# Patient Record
Sex: MALE | Race: WHITE | NOT HISPANIC OR LATINO
[De-identification: names, ages, dates, MRNs, and addresses within clinical notes are randomized per-mention and may not be internally consistent; named-entity substitution may affect disease eponyms.]

---

## 2019-08-22 PROBLEM — Z00.00 ENCOUNTER FOR PREVENTIVE HEALTH EXAMINATION: Status: ACTIVE | Noted: 2019-08-22

## 2019-09-04 ENCOUNTER — NON-APPOINTMENT (OUTPATIENT)
Age: 74
End: 2019-09-04

## 2019-09-04 ENCOUNTER — APPOINTMENT (OUTPATIENT)
Dept: CARDIOLOGY | Facility: CLINIC | Age: 74
End: 2019-09-04
Payer: MEDICARE

## 2019-09-04 VITALS
OXYGEN SATURATION: 95 % | HEIGHT: 72 IN | WEIGHT: 214 LBS | HEART RATE: 92 BPM | BODY MASS INDEX: 28.99 KG/M2 | DIASTOLIC BLOOD PRESSURE: 62 MMHG | SYSTOLIC BLOOD PRESSURE: 114 MMHG

## 2019-09-04 VITALS — DIASTOLIC BLOOD PRESSURE: 68 MMHG | SYSTOLIC BLOOD PRESSURE: 114 MMHG

## 2019-09-04 DIAGNOSIS — M75.50 BURSITIS OF UNSPECIFIED SHOULDER: ICD-10-CM

## 2019-09-04 DIAGNOSIS — Z78.9 OTHER SPECIFIED HEALTH STATUS: ICD-10-CM

## 2019-09-04 DIAGNOSIS — Z82.5 FAMILY HISTORY OF ASTHMA AND OTHER CHRONIC LOWER RESPIRATORY DISEASES: ICD-10-CM

## 2019-09-04 DIAGNOSIS — Z85.46 PERSONAL HISTORY OF MALIGNANT NEOPLASM OF PROSTATE: ICD-10-CM

## 2019-09-04 DIAGNOSIS — F12.90 CANNABIS USE, UNSPECIFIED, UNCOMPLICATED: ICD-10-CM

## 2019-09-04 DIAGNOSIS — Z86.39 PERSONAL HISTORY OF OTHER ENDOCRINE, NUTRITIONAL AND METABOLIC DISEASE: ICD-10-CM

## 2019-09-04 DIAGNOSIS — Z86.79 PERSONAL HISTORY OF OTHER DISEASES OF THE CIRCULATORY SYSTEM: ICD-10-CM

## 2019-09-04 DIAGNOSIS — Z87.39 PERSONAL HISTORY OF OTHER DISEASES OF THE MUSCULOSKELETAL SYSTEM AND CONNECTIVE TISSUE: ICD-10-CM

## 2019-09-04 DIAGNOSIS — Z82.49 FAMILY HISTORY OF ISCHEMIC HEART DISEASE AND OTHER DISEASES OF THE CIRCULATORY SYSTEM: ICD-10-CM

## 2019-09-04 DIAGNOSIS — Z60.2 PROBLEMS RELATED TO LIVING ALONE: ICD-10-CM

## 2019-09-04 PROCEDURE — 99215 OFFICE O/P EST HI 40 MIN: CPT

## 2019-09-04 PROCEDURE — 93000 ELECTROCARDIOGRAM COMPLETE: CPT

## 2019-09-04 RX ORDER — INSULIN DEGLUDEC INJECTION 100 U/ML
100 INJECTION, SOLUTION SUBCUTANEOUS
Qty: 15 | Refills: 0 | Status: DISCONTINUED | COMMUNITY
Start: 2019-03-07

## 2019-09-04 RX ORDER — EMPAGLIFLOZIN 10 MG/1
10 TABLET, FILM COATED ORAL
Qty: 45 | Refills: 0 | Status: DISCONTINUED | COMMUNITY
Start: 2019-03-21

## 2019-09-04 RX ORDER — EMPAGLIFLOZIN 25 MG/1
25 TABLET, FILM COATED ORAL
Qty: 90 | Refills: 0 | Status: DISCONTINUED | COMMUNITY
Start: 2019-05-02

## 2019-09-04 RX ORDER — SITAGLIPTIN AND METFORMIN HYDROCHLORIDE 50; 1000 MG/1; MG/1
50-1000 TABLET, FILM COATED ORAL
Qty: 60 | Refills: 0 | Status: DISCONTINUED | COMMUNITY
Start: 2019-05-14

## 2019-09-04 RX ORDER — LISINOPRIL 5 MG/1
5 TABLET ORAL DAILY
Qty: 30 | Refills: 0 | Status: ACTIVE | COMMUNITY
Start: 2019-05-14

## 2019-09-04 SDOH — SOCIAL STABILITY - SOCIAL INSECURITY: PROBLEMS RELATED TO LIVING ALONE: Z60.2

## 2019-09-04 NOTE — DISCUSSION/SUMMARY
[FreeTextEntry1] : Patient with several coronary risk factors including diabetes. Blood pressure is well controlled.\par \par EKG today is not significantly changed.\par \par Clinical carotid upstrokes, check carotid ultrasound.\par \par Sputum nonobstructive CAD. He does have shortness of breath on exertion. Epigastric chest and is difficult to differentiate from GERD. Nuclear stress test should be obtained. Also echocardiogram.\par \par Followup after above tests.\par \par Risk and benefits of stress testing along with its indications were discussed. Issue of radiation exposure was discussed.\par \par \par Sincerely,\par Kurt Crenshaw MD, FACC, KENIA

## 2019-09-04 NOTE — PHYSICAL EXAM
[General Appearance - Well Developed] : well developed [Normal Appearance] : normal appearance [Well Groomed] : well groomed [General Appearance - Well Nourished] : well nourished [No Deformities] : no deformities [General Appearance - In No Acute Distress] : no acute distress [Normal Conjunctiva] : the conjunctiva exhibited no abnormalities [Eyelids - No Xanthelasma] : the eyelids demonstrated no xanthelasmas [Normal Oral Mucosa] : normal oral mucosa [No Oral Pallor] : no oral pallor [No Oral Cyanosis] : no oral cyanosis [Heart Rate And Rhythm] : heart rate and rhythm were normal [Heart Sounds] : normal S1 and S2 [Murmurs] : no murmurs present [Edema] : no peripheral edema present [Respiration, Rhythm And Depth] : normal respiratory rhythm and effort [Exaggerated Use Of Accessory Muscles For Inspiration] : no accessory muscle use [Auscultation Breath Sounds / Voice Sounds] : lungs were clear to auscultation bilaterally [Abdomen Tenderness] : non-tender [Abdomen Soft] : soft [Abnormal Walk] : normal gait [Gait - Sufficient For Exercise Testing] : the gait was sufficient for exercise testing [Nail Clubbing] : no clubbing of the fingernails [Cyanosis, Localized] : no localized cyanosis [Skin Color & Pigmentation] : normal skin color and pigmentation [Skin Turgor] : normal skin turgor [] : no rash [Oriented To Time, Place, And Person] : oriented to person, place, and time [Affect] : the affect was normal [Mood] : the mood was normal [No Anxiety] : not feeling anxious [FreeTextEntry1] : No Carotid bruit. No JVD , Unequal carotid upstrokes

## 2019-09-04 NOTE — HISTORY OF PRESENT ILLNESS
[FreeTextEntry1] : Ivan is a 74 year A. 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 He has mild dyspnea on exertion. This activity is more limited by left leg joint pains.\par \par He denies fever edema, PND, orthopnea, syncope. No symptoms of TIA or CVA. No palpitations.\par \par Occasional epigastric chest discomfort is difficult to differentiate from GERD.

## 2019-09-06 ENCOUNTER — APPOINTMENT (OUTPATIENT)
Dept: CARDIOLOGY | Facility: CLINIC | Age: 74
End: 2019-09-06
Payer: MEDICARE

## 2019-09-06 PROCEDURE — 93880 EXTRACRANIAL BILAT STUDY: CPT

## 2019-09-06 PROCEDURE — 78452 HT MUSCLE IMAGE SPECT MULT: CPT

## 2019-09-06 PROCEDURE — A9502: CPT

## 2019-09-06 PROCEDURE — 93306 TTE W/DOPPLER COMPLETE: CPT

## 2019-09-06 PROCEDURE — 93015 CV STRESS TEST SUPVJ I&R: CPT

## 2019-09-12 ENCOUNTER — APPOINTMENT (OUTPATIENT)
Dept: CARDIOLOGY | Facility: CLINIC | Age: 74
End: 2019-09-12
Payer: MEDICARE

## 2019-09-12 VITALS
OXYGEN SATURATION: 95 % | HEIGHT: 72 IN | DIASTOLIC BLOOD PRESSURE: 60 MMHG | WEIGHT: 210 LBS | SYSTOLIC BLOOD PRESSURE: 100 MMHG | BODY MASS INDEX: 28.44 KG/M2 | HEART RATE: 86 BPM

## 2019-09-12 PROCEDURE — 99214 OFFICE O/P EST MOD 30 MIN: CPT

## 2019-09-12 NOTE — PHYSICAL EXAM
[General Appearance - Well Developed] : well developed [Normal Appearance] : normal appearance [Well Groomed] : well groomed [General Appearance - Well Nourished] : well nourished [No Deformities] : no deformities [General Appearance - In No Acute Distress] : no acute distress [Heart Rate And Rhythm] : heart rate and rhythm were normal [Heart Sounds] : normal S1 and S2 [Murmurs] : no murmurs present [Edema] : no peripheral edema present [Respiration, Rhythm And Depth] : normal respiratory rhythm and effort [Exaggerated Use Of Accessory Muscles For Inspiration] : no accessory muscle use [Abdomen Soft] : soft [Auscultation Breath Sounds / Voice Sounds] : lungs were clear to auscultation bilaterally [Abdomen Tenderness] : non-tender [Nail Clubbing] : no clubbing of the fingernails [Cyanosis, Localized] : no localized cyanosis [Normal Conjunctiva] : the conjunctiva exhibited no abnormalities [Eyelids - No Xanthelasma] : the eyelids demonstrated no xanthelasmas [Normal Oral Mucosa] : normal oral mucosa [No Oral Pallor] : no oral pallor [No Oral Cyanosis] : no oral cyanosis [FreeTextEntry1] : No Carotid bruit. No JVD , Unequal carotid upstrokes [Abnormal Walk] : normal gait [Gait - Sufficient For Exercise Testing] : the gait was sufficient for exercise testing [Skin Color & Pigmentation] : normal skin color and pigmentation [Skin Turgor] : normal skin turgor [] : no rash [Affect] : the affect was normal [Oriented To Time, Place, And Person] : oriented to person, place, and time [Mood] : the mood was normal [No Anxiety] : not feeling anxious

## 2019-09-12 NOTE — DISCUSSION/SUMMARY
[FreeTextEntry1] : DALIA MEJIA  is a 74 year M  who presents today Sep 12, 2019 with the above history and the following active issues. \par \par Risk factors for coronary artery disease including hypertension, hyperlipidemia, tobacco use, obesity, diabetes, age, gender.  Echocardiogram demonstrates overall normal resting cardiac structure and function.  Nuclear stress test without evidence of ischemia.  Limitations of noninvasive testing reviewed with patient.  Asymptomatic from cardiovascular standpoint.  Asymptomatic from an arrhythmia standpoint.  Importance of lifestyle and risk factor modification reviewed.\par \par Hypertension, blood pressure well-controlled on my assessment.  Continue her medication at current dosing.  Low sodium diet reviewed.  Increase cardiovascular exercise.\par \par Hyperlipidemia continue statin.  Weight loss recommended.  Continue lifestyle modification.\par \par Clinical carotid upstrokes, mild non-obstructive disease. Thyroid nodule noted. Recommend dedicated thyroid US and follow-up with PCP/endocrinology.\par \par Red flag symptoms which would warrant sooner emergent evaluation reviewed with the patient. \par Questions and concerns were addressed and answered. \par \par Sincerely,\par \par Lea Law PA-C\par Patients history, testing and plan reviewed with supervising MD: Dr. Patrick Valentino

## 2019-09-12 NOTE — HISTORY OF PRESENT ILLNESS
[FreeTextEntry1] : Ivan is a 74 year A. 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 Crenshaw. At that time nuclear stress test, echocardiogram and carotid duplex recommended, performed and he presents today to review the results. Today offers no new complaints. \manasa Continues to work as a .  Exerting himself on a regular basis with no new exertional complaints.  Denies recent illness or hospital stay.  \manasa Recently has been focusing on lifestyle changes and has lost approximately 20 pounds.\par \par Today he denies chest pain, pressure, unusual shortness of breath, lightheadedness, dizziness, near syncope or syncope. \par \par There is no prior history of myocardial infarction, coronary revascularization, history of ischemic heart disease, or symptomatic congestive heart failure. There is no history of symptomatic arrhythmias including atrial fibrillation. \par \par Echocardiogram September 6, 2019 ejection fraction 60%, mild mitral regurgitation, no significant change from October 2010.\par \par Carotid duplex 9/6/19 mild non-obstructive disease. Small thyroid nodule noted\par \par Nuclear stress test 9/2019 with Lexiscan - no ischemia

## 2020-02-27 ENCOUNTER — APPOINTMENT (OUTPATIENT)
Dept: CARDIOLOGY | Facility: CLINIC | Age: 75
End: 2020-02-27
Payer: MEDICARE

## 2020-02-27 VITALS
BODY MASS INDEX: 29.26 KG/M2 | HEIGHT: 72 IN | HEART RATE: 83 BPM | WEIGHT: 216 LBS | OXYGEN SATURATION: 97 % | DIASTOLIC BLOOD PRESSURE: 64 MMHG | SYSTOLIC BLOOD PRESSURE: 110 MMHG

## 2020-02-27 PROCEDURE — 99214 OFFICE O/P EST MOD 30 MIN: CPT

## 2020-02-27 NOTE — PHYSICAL EXAM
[Normal Appearance] : normal appearance [General Appearance - Well Developed] : well developed [Well Groomed] : well groomed [General Appearance - Well Nourished] : well nourished [No Deformities] : no deformities [General Appearance - In No Acute Distress] : no acute distress [Normal Conjunctiva] : the conjunctiva exhibited no abnormalities [Eyelids - No Xanthelasma] : the eyelids demonstrated no xanthelasmas [Normal Oral Mucosa] : normal oral mucosa [No Oral Pallor] : no oral pallor [Normal Jugular Venous A Waves Present] : normal jugular venous A waves present [No Oral Cyanosis] : no oral cyanosis [Normal Jugular Venous V Waves Present] : normal jugular venous V waves present [No Jugular Venous  A Waves] : no jugular venous  A waves [Respiration, Rhythm And Depth] : normal respiratory rhythm and effort [Auscultation Breath Sounds / Voice Sounds] : lungs were clear to auscultation bilaterally [Exaggerated Use Of Accessory Muscles For Inspiration] : no accessory muscle use [Heart Rate And Rhythm] : heart rate and rhythm were normal [Heart Sounds] : normal S1 and S2 [Murmurs] : no murmurs present [Abdomen Soft] : soft [Abdomen Tenderness] : non-tender [Abnormal Walk] : normal gait [Abdomen Mass (___ Cm)] : no abdominal mass palpated [Gait - Sufficient For Exercise Testing] : the gait was sufficient for exercise testing [Nail Clubbing] : no clubbing of the fingernails [Cyanosis, Localized] : no localized cyanosis [Petechial Hemorrhages (___cm)] : no petechial hemorrhages [Skin Color & Pigmentation] : normal skin color and pigmentation [Skin Lesions] : no skin lesions [No Venous Stasis] : no venous stasis [] : no rash [Oriented To Time, Place, And Person] : oriented to person, place, and time [No Xanthoma] : no  xanthoma was observed [No Skin Ulcers] : no skin ulcer [Mood] : the mood was normal [No Anxiety] : not feeling anxious [Affect] : the affect was normal

## 2020-02-27 NOTE — HISTORY OF PRESENT ILLNESS
[FreeTextEntry1] : Ivan Grant is a 74 year old male with a PMH of nonobstructive CAD by cardiac cath in 2007, diabetes, HTN, HLD, thyroid nodule following with endocrine, prostate ca s/p radiation.\par \par Last seen 9/4/19. In the interim there have been no hospitalizations or procedures. He has cut back on smoking. Is exercising 30-60 min per day in rowing machine w/o exertional complaints. He denies chest pain, pressure, palpitations, unusual shortness of breath, orthopnea, LE edema, lightheadedness, dizziness, near syncope or syncope. \par \par Testing:\par \par Thyroid u/s 9/17/19: Right lobe nodule and cyst.\par \par Echo 9/6/19: Mild MR, normal wall motion. EF 60%.\par \par Nuclear Stress testing 9/6/19: Osiris. No evidence of ischemia by EKG. Diaphragmatic artifact. Probably normal study. EF 54%.\par \par Carotid u/s 9/6/19: Mild nonobstructive dz BL. Small noted is noted on right thyroid.\par \par Labs 2/6/19L Hgb a1c 8\par

## 2020-02-27 NOTE — ASSESSMENT
[FreeTextEntry1] : DALIA MEJIA is a 74 year old M who presents today Feb 27, 2020 with the above history and the following active issues:\par \par nonobstructive CAD by cardiac cath in 2007: Asymptomatic. Nonischemic nuclear stress test 9.6.19.\par Given risk factors and hx of smoking recommend abd u/s to r/o AAA and atherosclerosis.\par \par Smoking cessation discussed.\par \par Diabetes: Ongoing f/u with Endocrine, Dr. Martinez.\par \par HTN: BP well controlled. Continue present medication regimen.\par \par HLD: Tolerating statin.\par \par Thyroid nodule: Ongoing following with endocrine.\par \par Lab slip provided to check CBC, CMP, Fasting lipid profile, CK, TSH, and Hgb A1C.\par \par Ongoing f/u with PCP Dr. Sawyer.\par \par F/U in 6 months.\par Discussed red flag symptoms, which would warrant sooner or emergent medical evaluation.\par Any questions and concerns were addressed and resolved.\par \par Sincerely,\par Raquel Lackey FN-BC\par Patient's history, testing, and plan was reviewed with supervising physician, Dr. Andrea Wilcox

## 2020-03-05 ENCOUNTER — APPOINTMENT (OUTPATIENT)
Dept: CARDIOLOGY | Facility: CLINIC | Age: 75
End: 2020-03-05
Payer: MEDICARE

## 2020-03-05 PROCEDURE — 93979 VASCULAR STUDY: CPT

## 2020-08-14 ENCOUNTER — APPOINTMENT (OUTPATIENT)
Dept: CARDIOLOGY | Facility: CLINIC | Age: 75
End: 2020-08-14
Payer: MEDICARE

## 2020-08-14 VITALS
HEART RATE: 93 BPM | BODY MASS INDEX: 28.99 KG/M2 | SYSTOLIC BLOOD PRESSURE: 104 MMHG | OXYGEN SATURATION: 96 % | WEIGHT: 214 LBS | HEIGHT: 72 IN | DIASTOLIC BLOOD PRESSURE: 62 MMHG

## 2020-08-14 PROCEDURE — 93000 ELECTROCARDIOGRAM COMPLETE: CPT

## 2020-08-14 PROCEDURE — 99214 OFFICE O/P EST MOD 30 MIN: CPT

## 2020-08-14 NOTE — ASSESSMENT
[FreeTextEntry1] : DALIA MEJIA is a 74 year old M who presents today Feb 27, 2020 with the above history and the following active issues:\par \par Nonobstructive CAD by cardiac cath in 2007: Asymptomatic. Nonischemic nuclear stress test 9.6.19.\par \par Diabetes: Ongoing f/u with Endocrine, Dr. Martinez.\par \par HTN: BP well controlled. Continue present medication regimen.\par \par HLD: Tolerating statin.\par \par Thyroid nodule: Ongoing following with endocrine.\par \par Patient has not had labs even though he was given a lab slip.  He is going for fasting labs in few weeks with his primary care.  He will send us a copy. \par \par F/U in 6 months.\par \par \par Thank you for this referral and allowing me to participate in the care of this patient.  If can be of any further help or  if you have any questions, please do not hesitate to contact me\par \par \par Sincerely,\par \par Kurt Crenshaw MD, FACC, KENIA \par

## 2020-08-14 NOTE — HISTORY OF PRESENT ILLNESS
[FreeTextEntry1] : Ivan Grant is a 75 year old male with a PMH of nonobstructive CAD by cardiac cath in 2007, diabetes, HTN, HLD, thyroid nodule following with endocrine, prostate ca s/p radiation.\par \par Last seen 9/4/19. In the interim there have been no hospitalizations or procedures.  He has now quit smoking.  I have congratulated him.  \par \par He is exercising 30-60 min per day in rowing machine w/o exertional complaints. He denies chest pain, pressure, palpitations, unusual shortness of breath, orthopnea, LE edema, lightheadedness, dizziness, near syncope or syncope. \par \par Previous testing:\par \par Thyroid u/s 9/17/19: Right lobe nodule and cyst.\par \par Echo 9/6/19: Mild MR, normal wall motion. EF 60%.\par \par Nuclear Stress testing 9/6/19: Osiris. No evidence of ischemia by EKG. Diaphragmatic artifact. Probably normal study. EF 54%.\par \par Carotid u/s 9/6/19: Mild nonobstructive dz BL. Small noted is noted on right thyroid.\par \par Labs 2/6/19L Hgb a1c 8\par

## 2020-08-14 NOTE — PHYSICAL EXAM
[General Appearance - Well Developed] : well developed [General Appearance - Well Nourished] : well nourished [Well Groomed] : well groomed [Normal Appearance] : normal appearance [General Appearance - In No Acute Distress] : no acute distress [No Deformities] : no deformities [Normal Conjunctiva] : the conjunctiva exhibited no abnormalities [Eyelids - No Xanthelasma] : the eyelids demonstrated no xanthelasmas [No Oral Cyanosis] : no oral cyanosis [Normal Oral Mucosa] : normal oral mucosa [No Oral Pallor] : no oral pallor [Normal Jugular Venous V Waves Present] : normal jugular venous V waves present [Normal Jugular Venous A Waves Present] : normal jugular venous A waves present [No Jugular Venous  A Waves] : no jugular venous  A waves [Respiration, Rhythm And Depth] : normal respiratory rhythm and effort [Exaggerated Use Of Accessory Muscles For Inspiration] : no accessory muscle use [Auscultation Breath Sounds / Voice Sounds] : lungs were clear to auscultation bilaterally [Heart Rate And Rhythm] : heart rate and rhythm were normal [Abdomen Soft] : soft [Heart Sounds] : normal S1 and S2 [Murmurs] : no murmurs present [Abdomen Tenderness] : non-tender [Abdomen Mass (___ Cm)] : no abdominal mass palpated [Abnormal Walk] : normal gait [Nail Clubbing] : no clubbing of the fingernails [Gait - Sufficient For Exercise Testing] : the gait was sufficient for exercise testing [Cyanosis, Localized] : no localized cyanosis [Petechial Hemorrhages (___cm)] : no petechial hemorrhages [Skin Color & Pigmentation] : normal skin color and pigmentation [] : no rash [Skin Lesions] : no skin lesions [No Skin Ulcers] : no skin ulcer [No Venous Stasis] : no venous stasis [No Xanthoma] : no  xanthoma was observed [Oriented To Time, Place, And Person] : oriented to person, place, and time [Affect] : the affect was normal [Mood] : the mood was normal [No Anxiety] : not feeling anxious

## 2021-03-25 ENCOUNTER — APPOINTMENT (OUTPATIENT)
Dept: CARDIOLOGY | Facility: CLINIC | Age: 76
End: 2021-03-25
Payer: MEDICARE

## 2021-03-25 VITALS
TEMPERATURE: 98 F | WEIGHT: 225 LBS | DIASTOLIC BLOOD PRESSURE: 80 MMHG | HEIGHT: 72 IN | HEART RATE: 93 BPM | SYSTOLIC BLOOD PRESSURE: 126 MMHG | BODY MASS INDEX: 30.48 KG/M2 | OXYGEN SATURATION: 96 %

## 2021-03-25 DIAGNOSIS — Z87.891 PERSONAL HISTORY OF NICOTINE DEPENDENCE: ICD-10-CM

## 2021-03-25 PROCEDURE — 99214 OFFICE O/P EST MOD 30 MIN: CPT

## 2021-03-25 RX ORDER — ATORVASTATIN CALCIUM 40 MG/1
40 TABLET, FILM COATED ORAL
Qty: 30 | Refills: 0 | Status: ACTIVE | COMMUNITY
Start: 2019-03-22

## 2021-03-25 RX ORDER — PEN NEEDLE, DIABETIC 32 GX 1/4"
32G X 6 MM NEEDLE, DISPOSABLE MISCELLANEOUS
Refills: 0 | Status: ACTIVE | COMMUNITY
Start: 2019-03-15

## 2021-03-25 RX ORDER — ERGOCALCIFEROL 1.25 MG/1
1.25 MG CAPSULE, LIQUID FILLED ORAL
Refills: 0 | Status: ACTIVE | COMMUNITY
Start: 2019-03-21

## 2021-03-25 RX ORDER — OMEPRAZOLE 20 MG/1
20 CAPSULE, DELAYED RELEASE ORAL
Refills: 0 | Status: ACTIVE | COMMUNITY
Start: 2019-06-07

## 2021-03-25 RX ORDER — NATEGLINIDE 120 MG/1
120 TABLET, COATED ORAL EVERY 8 HOURS
Refills: 0 | Status: ACTIVE | COMMUNITY
Start: 2019-06-13

## 2021-03-25 RX ORDER — ASPIRIN ENTERIC COATED TABLETS 81 MG 81 MG/1
81 TABLET, DELAYED RELEASE ORAL
Qty: 90 | Refills: 1 | Status: ACTIVE | COMMUNITY
Start: 2021-03-25

## 2021-03-25 RX ORDER — BLOOD SUGAR DIAGNOSTIC
STRIP MISCELLANEOUS
Refills: 0 | Status: ACTIVE | COMMUNITY
Start: 2019-02-26

## 2021-03-25 RX ORDER — DOXAZOSIN 1 MG/1
1 TABLET ORAL DAILY
Refills: 0 | Status: ACTIVE | COMMUNITY
Start: 2019-04-15

## 2021-03-25 RX ORDER — SEMAGLUTIDE 1.34 MG/ML
2 INJECTION, SOLUTION SUBCUTANEOUS
Refills: 0 | Status: ACTIVE | COMMUNITY
Start: 2019-07-25

## 2021-03-25 RX ORDER — SITAGLIPTIN AND METFORMIN HYDROCHLORIDE 50; 1000 MG/1; MG/1
50-1000 TABLET, FILM COATED, EXTENDED RELEASE ORAL
Qty: 60 | Refills: 0 | Status: DISCONTINUED | COMMUNITY
Start: 2019-06-13 | End: 2021-03-25

## 2021-03-25 NOTE — HISTORY OF PRESENT ILLNESS
[FreeTextEntry1] : Ivan Grant is a 75 year old male with a PMH of nonobstructive CAD by cardiac cath in 2007, diabetes, HTN, HLD, thyroid nodule following with endocrine, prostate ca s/p radiation.\par \par Last seen in August 2020, in the interim there have been no hospitalizations or procedures.  He has  quit smoking since August 2020.  Over the past 6 months, he has gained weight.  He has slight shortness of breath on exertion.\par \par He is exercising 30-60 min per day in rowing machine w/o exertional complaints. He denies chest pain, pressure, palpitations, orthopnea, LE edema, lightheadedness, dizziness, near syncope or syncope.  He does have some shortness of breath on exertion.\par \par Previous testing:\par \par Thyroid u/s 9/17/19: Right lobe nodule and cyst.\par \par Echo 9/6/19: Mild MR, normal wall motion. EF 60%.\par \par Nuclear Stress testing 9/6/19: Osiris. No evidence of ischemia by EKG. Diaphragmatic artifact. Probably normal study. EF 54%.\par \par Carotid u/s 9/6/19: Mild nonobstructive dz BL. Small noted is noted on right thyroid.\par \par Labs 2/6/19L Hgb a1c 8\par

## 2021-03-25 NOTE — PHYSICAL EXAM
[General Appearance - Well Developed] : well developed [Normal Appearance] : normal appearance [Well Groomed] : well groomed [General Appearance - Well Nourished] : well nourished [No Deformities] : no deformities [General Appearance - In No Acute Distress] : no acute distress [Normal Conjunctiva] : the conjunctiva exhibited no abnormalities [Eyelids - No Xanthelasma] : the eyelids demonstrated no xanthelasmas [Normal Oral Mucosa] : normal oral mucosa [No Oral Pallor] : no oral pallor [No Oral Cyanosis] : no oral cyanosis [Normal Jugular Venous A Waves Present] : normal jugular venous A waves present [Normal Jugular Venous V Waves Present] : normal jugular venous V waves present [No Jugular Venous  A Waves] : no jugular venous  A waves [Respiration, Rhythm And Depth] : normal respiratory rhythm and effort [Exaggerated Use Of Accessory Muscles For Inspiration] : no accessory muscle use [Auscultation Breath Sounds / Voice Sounds] : lungs were clear to auscultation bilaterally [Heart Rate And Rhythm] : heart rate and rhythm were normal [Heart Sounds] : normal S1 and S2 [Murmurs] : no murmurs present [Abdomen Soft] : soft [Abdomen Tenderness] : non-tender [Abdomen Mass (___ Cm)] : no abdominal mass palpated [Abnormal Walk] : normal gait [Gait - Sufficient For Exercise Testing] : the gait was sufficient for exercise testing [Nail Clubbing] : no clubbing of the fingernails [Cyanosis, Localized] : no localized cyanosis [Petechial Hemorrhages (___cm)] : no petechial hemorrhages [Skin Color & Pigmentation] : normal skin color and pigmentation [] : no rash [No Venous Stasis] : no venous stasis [Skin Lesions] : no skin lesions [No Skin Ulcers] : no skin ulcer [No Xanthoma] : no  xanthoma was observed [Oriented To Time, Place, And Person] : oriented to person, place, and time [Affect] : the affect was normal [Mood] : the mood was normal [No Anxiety] : not feeling anxious

## 2021-06-28 ENCOUNTER — TRANSCRIPTION ENCOUNTER (OUTPATIENT)
Age: 76
End: 2021-06-28

## 2021-11-18 ENCOUNTER — APPOINTMENT (OUTPATIENT)
Dept: CARDIOLOGY | Facility: CLINIC | Age: 76
End: 2021-11-18
Payer: MEDICARE

## 2021-11-18 PROCEDURE — 93306 TTE W/DOPPLER COMPLETE: CPT

## 2021-11-24 ENCOUNTER — APPOINTMENT (OUTPATIENT)
Dept: CARDIOLOGY | Facility: CLINIC | Age: 76
End: 2021-11-24
Payer: MEDICARE

## 2021-11-24 VITALS
BODY MASS INDEX: 29.93 KG/M2 | OXYGEN SATURATION: 99 % | HEART RATE: 97 BPM | SYSTOLIC BLOOD PRESSURE: 120 MMHG | WEIGHT: 221 LBS | TEMPERATURE: 98.4 F | DIASTOLIC BLOOD PRESSURE: 64 MMHG | HEIGHT: 72 IN

## 2021-11-24 DIAGNOSIS — Z86.39 PERSONAL HISTORY OF OTHER ENDOCRINE, NUTRITIONAL AND METABOLIC DISEASE: ICD-10-CM

## 2021-11-24 PROCEDURE — 99214 OFFICE O/P EST MOD 30 MIN: CPT

## 2021-11-24 RX ORDER — GLIPIZIDE 5 MG/1
5 TABLET ORAL DAILY
Refills: 0 | Status: ACTIVE | COMMUNITY

## 2021-11-24 RX ORDER — EMPAGLIFLOZIN, METFORMIN HYDROCHLORIDE 12.5; 1 MG/1; MG/1
12.5-1 TABLET, EXTENDED RELEASE ORAL
Refills: 0 | Status: DISCONTINUED | COMMUNITY
Start: 2019-07-25 | End: 2021-11-24

## 2021-11-24 NOTE — ASSESSMENT
[FreeTextEntry1] : DALIA MEJIA is a 76 year old M with following active issues:\par \par Nonobstructive CAD by cardiac cath in 2007: Asymptomatic. Nonischemic nuclear stress test 9.6.19.\par \par Diabetes: Ongoing f/u with Endocrine, Dr. Solis.  He is trying to reduce calories by dieting and he exercises 30-60 minutes every day with rowing machine.\par \par HTN: BP well controlled. Continue present medication regimen.\par \par HLD: Tolerating statin.  Last LDL 64 in December 2020\par \par Thyroid nodule: Ongoing following with endocrine.\par \par Patient recently quit smoking again.  Not ideally controlled diabetes.  Several other risk factors.  Pain in the legs with walking.  Check EVELYN.  Also carotid ultrasound.  He will call for the results.\par \par \par Thank you for this referral and allowing me to participate in the care of this patient.  If can be of any further help or  if you have any questions, please do not hesitate to contact me\par \par \par Sincerely,\par \par Kurt Crenshaw MD, FACC, KENIA \par

## 2021-11-24 NOTE — HISTORY OF PRESENT ILLNESS
[FreeTextEntry1] : Ivan Grant is a 76 year old male with a PMH of nonobstructive CAD by cardiac cath in 2007, diabetes, HTN, HLD, thyroid nodule following with endocrine, prostate ca s/p radiation.\par \par He had  quit smoking August 2020 and again last month.  He had gained weight after smoking cessation but in the past few weeks he has been able to lose  wt.  Currently weighs 225 pounds. \par \par He has slight shortness of breath on exertion.  He denies PND, orthopnea, angina.  He does have pain in the legs on walking.\par \par He used to exercise with rowing machine.\par \par \par Previous testing:\par \par Thyroid u/s 9/17/19: Right lobe nodule and cyst.\par \par Echo 9/6/19: Mild MR, normal wall motion. EF 60%.\par \par Nuclear Stress testing 9/6/19: [facility]. No evidence of ischemia by EKG. Diaphragmatic artifact. Probably normal study. EF 54%.\par \par Carotid u/s 9/6/19: Mild nonobstructive dz BL. Small noted is noted on right thyroid.\par \par Labs 2/6/19L Hgb a1c 8\par \par Echocardiogram October 2021: Normal EF.  Normal PASP.  Mild LVH.\par

## 2021-11-24 NOTE — PHYSICAL EXAM
[General Appearance - Well Developed] : well developed [Normal Appearance] : normal appearance [Well Groomed] : well groomed [General Appearance - Well Nourished] : well nourished [No Deformities] : no deformities [General Appearance - In No Acute Distress] : no acute distress [Normal Conjunctiva] : the conjunctiva exhibited no abnormalities [Eyelids - No Xanthelasma] : the eyelids demonstrated no xanthelasmas [Normal Oral Mucosa] : normal oral mucosa [No Oral Pallor] : no oral pallor [No Oral Cyanosis] : no oral cyanosis [Normal Jugular Venous A Waves Present] : normal jugular venous A waves present [Normal Jugular Venous V Waves Present] : normal jugular venous V waves present [No Jugular Venous  A Waves] : no jugular venous  A waves [Respiration, Rhythm And Depth] : normal respiratory rhythm and effort [Exaggerated Use Of Accessory Muscles For Inspiration] : no accessory muscle use [Auscultation Breath Sounds / Voice Sounds] : lungs were clear to auscultation bilaterally [Heart Rate And Rhythm] : heart rate and rhythm were normal [Heart Sounds] : normal S1 and S2 [Murmurs] : no murmurs present [FreeTextEntry1] : Lower extremity pulses by palpation appear diminished.  No ischemic signs. [Abdomen Soft] : soft [Abdomen Tenderness] : non-tender [Abdomen Mass (___ Cm)] : no abdominal mass palpated [Abnormal Walk] : normal gait [Gait - Sufficient For Exercise Testing] : the gait was sufficient for exercise testing [Nail Clubbing] : no clubbing of the fingernails [Cyanosis, Localized] : no localized cyanosis [Petechial Hemorrhages (___cm)] : no petechial hemorrhages [Skin Color & Pigmentation] : normal skin color and pigmentation [] : no rash [No Venous Stasis] : no venous stasis [Skin Lesions] : no skin lesions [No Skin Ulcers] : no skin ulcer [No Xanthoma] : no  xanthoma was observed [Oriented To Time, Place, And Person] : oriented to person, place, and time [Affect] : the affect was normal [Mood] : the mood was normal [No Anxiety] : not feeling anxious

## 2021-12-11 ENCOUNTER — TRANSCRIPTION ENCOUNTER (OUTPATIENT)
Age: 76
End: 2021-12-11

## 2021-12-29 ENCOUNTER — APPOINTMENT (OUTPATIENT)
Dept: CARDIOLOGY | Facility: CLINIC | Age: 76
End: 2021-12-29
Payer: MEDICARE

## 2021-12-29 PROCEDURE — 93923 UPR/LXTR ART STDY 3+ LVLS: CPT

## 2021-12-29 PROCEDURE — 93880 EXTRACRANIAL BILAT STUDY: CPT

## 2021-12-30 ENCOUNTER — NON-APPOINTMENT (OUTPATIENT)
Age: 76
End: 2021-12-30

## 2022-05-12 ENCOUNTER — APPOINTMENT (OUTPATIENT)
Dept: CARDIOLOGY | Facility: CLINIC | Age: 77
End: 2022-05-12
Payer: MEDICARE

## 2022-05-12 VITALS
WEIGHT: 225 LBS | BODY MASS INDEX: 30.48 KG/M2 | HEART RATE: 90 BPM | SYSTOLIC BLOOD PRESSURE: 100 MMHG | DIASTOLIC BLOOD PRESSURE: 64 MMHG | OXYGEN SATURATION: 97 % | HEIGHT: 72 IN | TEMPERATURE: 97.7 F | RESPIRATION RATE: 14 BRPM

## 2022-05-12 DIAGNOSIS — I25.10 ATHEROSCLEROTIC HEART DISEASE OF NATIVE CORONARY ARTERY W/OUT ANGINA PECTORIS: ICD-10-CM

## 2022-05-12 DIAGNOSIS — E78.00 PURE HYPERCHOLESTEROLEMIA, UNSPECIFIED: ICD-10-CM

## 2022-05-12 DIAGNOSIS — R07.89 OTHER CHEST PAIN: ICD-10-CM

## 2022-05-12 DIAGNOSIS — I10 ESSENTIAL (PRIMARY) HYPERTENSION: ICD-10-CM

## 2022-05-12 DIAGNOSIS — E11.9 TYPE 2 DIABETES MELLITUS W/OUT COMPLICATIONS: ICD-10-CM

## 2022-05-12 DIAGNOSIS — K44.9 DIAPHRAGMATIC HERNIA W/OUT OBSTRUCTION OR GANGRENE: ICD-10-CM

## 2022-05-12 PROCEDURE — 99214 OFFICE O/P EST MOD 30 MIN: CPT

## 2022-05-12 NOTE — HISTORY OF PRESENT ILLNESS
[FreeTextEntry1] : Ivan Grant is a 76 year old male with a PMH of nonobstructive CAD by cardiac cath in 2007, diabetes, HTN, HLD, thyroid nodule following with endocrine, prostate ca s/p radiation.\par \par He had  quit smoking August 2020 and again last month.  He had gained weight after smoking cessation - Currently weighs 225 pounds. \par \par He has slight shortness of breath on exertion.  He denies PND, orthopnea, palpitations or syncope; he does have pain in the legs on walking.  His EVELYN was noncompressible on the left and normal on the right with good waveforms\par \par He used to exercise with rowing machine.  He has occasional rare episodes of mild discomfort in the epigastric region with possible tenderness.  It is localized and random\par \par \par Previous testing:\par \par Thyroid u/s 9/17/19: Right lobe nodule and cyst.\par \par Echo 9/6/19: Mild MR, normal wall motion. EF 60%.\par \par Nuclear Stress testing 9/6/19: [facility]iris. No evidence of ischemia by EKG. Diaphragmatic artifact. Probably normal study. EF 54%.\par \par Carotid u/s 9/6/19: Mild nonobstructive dz BL. Small noted is noted on right thyroid.\par \par Labs 2/6/19L Hgb a1c 8\par \par Echocardiogram October 2021: Normal EF.  Normal PASP.  Mild LVH.\par

## 2022-05-12 NOTE — ASSESSMENT
[FreeTextEntry1] : DALIA MEJIA is a 76 year old M with following active issues:\par \par Nonobstructive CAD by cardiac cath in 2007: Asymptomatic. Nonischemic nuclear stress test 9.6.19.  Complain of atypical chest discomfort as described in HPI.  He has several risk factors.  If the chest discomfort continues, evaluation with nuclear stress test is indicated.  He would not be able to exercise vigorously on treadmill and therefore Lexiscan would be appropriate.\par \par Diabetes: Ongoing f/u with Endocrine, Dr. Solis.  He is trying to reduce calories by dieting and he exercises 30-60 minutes every day with rowing machine.\par \par HTN: BP well controlled. Continue present medication regimen.  Asymptomatic with borderline low blood pressure today.\par \par HLD: Tolerating statin.  Last LDL 64 in December 2020\par \par Thyroid nodule: Ongoing following with endocrine.\par \par Patient recently quit smoking again.  Not ideally controlled diabetes.  Several other risk factors.  Pain in the legs with walking.  We discussed findings of EVELYN as well as echo and carotid ultrasound from December 2021\par \par \par Thank you for this referral and allowing me to participate in the care of this patient.  If can be of any further help or  if you have any questions, please do not hesitate to contact me\par \par \par Sincerely,\par \par Kurt Crenshaw MD, FACC, KENIA \par

## 2022-05-27 ENCOUNTER — EMERGENCY (EMERGENCY)
Facility: HOSPITAL | Age: 77
LOS: 1 days | Discharge: HOME | End: 2022-05-27
Admitting: EMERGENCY MEDICINE
Payer: MEDICARE

## 2022-05-27 DIAGNOSIS — S79.812A OTHER SPECIFIED INJURIES OF LEFT HIP, INITIAL ENCOUNTER: ICD-10-CM
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
## 2022-05-27 DIAGNOSIS — Z04.1 ENCOUNTER FOR EXAMINATION AND OBSERVATION FOLLOWING TRANSPORT ACCIDENT: ICD-10-CM
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
## 2022-05-27 DIAGNOSIS — M25.532 PAIN IN LEFT WRIST: ICD-10-CM

## 2022-05-27 DIAGNOSIS — E78.00 PURE HYPERCHOLESTEROLEMIA, UNSPECIFIED: ICD-10-CM

## 2022-05-27 DIAGNOSIS — M25.512 PAIN IN LEFT SHOULDER: ICD-10-CM
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
## 2022-05-27 PROCEDURE — 73130 X-RAY EXAM OF HAND: CPT | Mod: 26,LT

## 2022-05-27 PROCEDURE — 73502 X-RAY EXAM HIP UNI 2-3 VIEWS: CPT | Mod: 26,LT

## 2022-05-27 PROCEDURE — 73110 X-RAY EXAM OF WRIST: CPT | Mod: 26,LT

## 2022-05-27 PROCEDURE — 73030 X-RAY EXAM OF SHOULDER: CPT | Mod: 26,LT

## 2022-05-27 PROCEDURE — 71045 X-RAY EXAM CHEST 1 VIEW: CPT | Mod: 26

## 2022-05-27 PROCEDURE — 99283 EMERGENCY DEPT VISIT LOW MDM: CPT | Mod: FS

## 2022-08-23 ENCOUNTER — APPOINTMENT (OUTPATIENT)
Dept: CARDIOLOGY | Facility: CLINIC | Age: 77
End: 2022-08-23

## 2022-08-31 DIAGNOSIS — J44.9 CHRONIC OBSTRUCTIVE PULMONARY DISEASE, UNSPECIFIED: ICD-10-CM

## 2022-09-06 ENCOUNTER — APPOINTMENT (OUTPATIENT)
Dept: CARDIOLOGY | Facility: CLINIC | Age: 77
End: 2022-09-06

## 2022-09-08 ENCOUNTER — APPOINTMENT (OUTPATIENT)
Dept: CARDIOLOGY | Facility: CLINIC | Age: 77
End: 2022-09-08

## 2023-04-13 ENCOUNTER — OFFICE (OUTPATIENT)
Dept: URBAN - METROPOLITAN AREA CLINIC 97 | Facility: CLINIC | Age: 78
Setting detail: OPHTHALMOLOGY
End: 2023-04-13
Payer: MEDICARE

## 2023-04-13 DIAGNOSIS — H35.3132: ICD-10-CM

## 2023-04-13 DIAGNOSIS — E11.9: ICD-10-CM

## 2023-04-13 DIAGNOSIS — H25.12: ICD-10-CM

## 2023-04-13 DIAGNOSIS — Z96.1: ICD-10-CM

## 2023-04-13 DIAGNOSIS — H35.032: ICD-10-CM

## 2023-04-13 DIAGNOSIS — H31.012: ICD-10-CM

## 2023-04-13 DIAGNOSIS — H43.812: ICD-10-CM

## 2023-04-13 PROCEDURE — 92014 COMPRE OPH EXAM EST PT 1/>: CPT | Performed by: OPHTHALMOLOGY

## 2023-04-13 PROCEDURE — 92134 CPTRZ OPH DX IMG PST SGM RTA: CPT | Performed by: OPHTHALMOLOGY

## 2023-04-13 ASSESSMENT — REFRACTION_CURRENTRX
OS_AXIS: 179
OD_CYLINDER: +0.75
OS_ADD: +2.50
OS_CYLINDER: +1.25
OD_SPHERE: +1.00
OD_OVR_VA: 20/
OS_SPHERE: +0.25
OD_AXIS: 177
OD_ADD: +2.50
OD_VPRISM_DIRECTION: BF
OS_VPRISM_DIRECTION: BF
OS_OVR_VA: 20/

## 2023-04-13 ASSESSMENT — REFRACTION_MANIFEST
OS_SPHERE: +0.50
OS_VA2: 20/25(J1)
OD_ADD: +2.50
OS_VA1: 20/20
OD_AXIS: 055
OD_VA2: 20/30(J2)
OS_VA2: 20/25(J1)
OD_CYLINDER: +0.50
OD_VA1: 20/25
OS_ADD: +2.50
OD_VA1: 20/20
OS_CYLINDER: +0.75
OD_CYLINDER: +0.50
OS_AXIS: 165
OU_VA: 20/20-
OD_SPHERE: PLANO
OS_SPHERE: +2.75
OS_VA1: 20/20
OS_CYLINDER: +0.75
OD_AXIS: 055
OD_VA2: 20/30(J2)
OU_VA: 20/20
OS_AXIS: 165
OD_SPHERE: +2.25

## 2023-04-13 ASSESSMENT — AXIALLENGTH_DERIVED
OS_AL: 18.82
OS_AL: 18.82
OD_AL: 22.5302
OS_AL: 18.27

## 2023-04-13 ASSESSMENT — KERATOMETRY
OS_K1POWER_DIOPTERS: 73.75
OS_K2POWER_DIOPTERS: 44.00
OD_AXISANGLE_DEGREES: 043
OS_AXISANGLE_DEGREES: 175
METHOD_AUTO_MANUAL: AUTO
OD_K1POWER_DIOPTERS: 43.75
OD_K2POWER_DIOPTERS: 44.00

## 2023-04-13 ASSESSMENT — REFRACTION_AUTOREFRACTION
OD_CYLINDER: +0.50
OS_SPHERE: +0.50
OD_SPHERE: PLANO
OS_CYLINDER: +0.75
OD_AXIS: 047
OS_AXIS: 162

## 2023-04-13 ASSESSMENT — SPHEQUIV_DERIVED
OS_SPHEQUIV: 0.875
OD_SPHEQUIV: 2.5
OS_SPHEQUIV: 0.875
OS_SPHEQUIV: 3.125

## 2023-04-13 ASSESSMENT — CONFRONTATIONAL VISUAL FIELD TEST (CVF)
OS_FINDINGS: FULL
OD_FINDINGS: FULL

## 2023-04-13 ASSESSMENT — VISUAL ACUITY
OD_BCVA: 20/40-
OS_BCVA: 20/25

## 2023-05-01 ENCOUNTER — NON-APPOINTMENT (OUTPATIENT)
Age: 78
End: 2023-05-01

## 2023-05-08 ENCOUNTER — TRANSCRIPTION ENCOUNTER (OUTPATIENT)
Age: 78
End: 2023-05-08

## 2023-09-19 NOTE — ASSESSMENT
[FreeTextEntry1] : DALIA MEJIA is a 75 year old M with following active issues:\par \par Nonobstructive CAD by cardiac cath in 2007: Asymptomatic. Nonischemic nuclear stress test 9.6.19.\par \par Diabetes: Ongoing f/u with Endocrine, Dr. Martinez.  As per the patient, blood sugars are not well controlled.  Since he quit smoking in August 2020, he has gained significant weight.  He is trying to reduce calories by dieting and he exercises 30-60 minutes every day with rowing machine.\par \par HTN: BP well controlled. Continue present medication regimen.\par \par HLD: Tolerating statin.  He had fasting labs with Dr. Roman 3 months ago.  We will request a copy of this.\par \par Thyroid nodule: Ongoing following with endocrine.\par \par \par F/U in 6-8  months.  Echocardiogram in future for follow-up of systolic and diastolic function as well as PASP.he does have dyspnea on exertion.\par \par \par Thank you for this referral and allowing me to participate in the care of this patient.  If can be of any further help or  if you have any questions, please do not hesitate to contact me\par \par \par Sincerely,\par \par Kurt Crenshaw MD, FACC, KENIA \par  Unsure (2)

## 2023-10-14 ENCOUNTER — NON-APPOINTMENT (OUTPATIENT)
Age: 78
End: 2023-10-14

## 2023-11-13 ENCOUNTER — OFFICE (OUTPATIENT)
Dept: URBAN - METROPOLITAN AREA CLINIC 38 | Facility: CLINIC | Age: 78
Setting detail: OPHTHALMOLOGY
End: 2023-11-13
Payer: MEDICARE

## 2023-11-13 DIAGNOSIS — H31.012: ICD-10-CM

## 2023-11-13 DIAGNOSIS — Z96.1: ICD-10-CM

## 2023-11-13 DIAGNOSIS — H25.12: ICD-10-CM

## 2023-11-13 DIAGNOSIS — H43.812: ICD-10-CM

## 2023-11-13 DIAGNOSIS — E11.9: ICD-10-CM

## 2023-11-13 DIAGNOSIS — H35.3132: ICD-10-CM

## 2023-11-13 DIAGNOSIS — H35.032: ICD-10-CM

## 2023-11-13 PROCEDURE — 99213 OFFICE O/P EST LOW 20 MIN: CPT

## 2023-11-13 ASSESSMENT — REFRACTION_MANIFEST
OS_AXIS: 165
OU_VA: 20/20
OS_SPHERE: +2.75
OD_VA1: 20/25
OU_VA: 20/20-
OS_VA2: 20/25(J1)
OS_ADD: +2.50
OD_AXIS: 055
OD_ADD: +2.50
OD_SPHERE: +2.25
OD_VA1: 20/20
OD_CYLINDER: +0.50
OS_CYLINDER: +0.75
OS_VA2: 20/25(J1)
OS_VA1: 20/20
OD_VA2: 20/30(J2)
OS_SPHERE: +0.50
OD_VA2: 20/30(J2)
OD_AXIS: 055
OD_CYLINDER: +0.50
OS_VA1: 20/20
OS_CYLINDER: +0.75
OS_AXIS: 165
OD_SPHERE: PLANO

## 2023-11-13 ASSESSMENT — SPHEQUIV_DERIVED
OD_SPHEQUIV: 2.5
OS_SPHEQUIV: 0.875
OS_SPHEQUIV: 3.125
OS_SPHEQUIV: 0.875

## 2023-11-13 ASSESSMENT — REFRACTION_CURRENTRX
OS_OVR_VA: 20/
OD_CYLINDER: +0.75
OD_OVR_VA: 20/
OS_ADD: +2.50
OD_ADD: +2.50
OS_VPRISM_DIRECTION: BF
OS_SPHERE: +0.25
OS_CYLINDER: +1.25
OD_AXIS: 177
OS_AXIS: 179
OD_SPHERE: +1.00
OD_VPRISM_DIRECTION: BF

## 2023-11-13 ASSESSMENT — REFRACTION_AUTOREFRACTION
OD_CYLINDER: +0.50
OD_AXIS: 047
OS_CYLINDER: +0.75
OS_AXIS: 162
OS_SPHERE: +0.50
OD_SPHERE: PLANO

## 2023-11-13 ASSESSMENT — CONFRONTATIONAL VISUAL FIELD TEST (CVF)
OD_FINDINGS: FULL
OS_FINDINGS: FULL

## 2024-01-07 ENCOUNTER — NON-APPOINTMENT (OUTPATIENT)
Age: 79
End: 2024-01-07

## 2024-02-12 ENCOUNTER — OFFICE (OUTPATIENT)
Dept: URBAN - METROPOLITAN AREA CLINIC 97 | Facility: CLINIC | Age: 79
Setting detail: OPHTHALMOLOGY
End: 2024-02-12
Payer: MEDICARE

## 2024-02-12 DIAGNOSIS — H25.12: ICD-10-CM

## 2024-02-12 DIAGNOSIS — H52.4: ICD-10-CM

## 2024-02-12 DIAGNOSIS — H31.002: ICD-10-CM

## 2024-02-12 DIAGNOSIS — Z96.1: ICD-10-CM

## 2024-02-12 DIAGNOSIS — H43.812: ICD-10-CM

## 2024-02-12 DIAGNOSIS — H02.831: ICD-10-CM

## 2024-02-12 DIAGNOSIS — H31.013: ICD-10-CM

## 2024-02-12 DIAGNOSIS — E11.9: ICD-10-CM

## 2024-02-12 DIAGNOSIS — H02.834: ICD-10-CM

## 2024-02-12 DIAGNOSIS — H35.032: ICD-10-CM

## 2024-02-12 DIAGNOSIS — H35.3132: ICD-10-CM

## 2024-02-12 PROCEDURE — 92201 OPSCPY EXTND RTA DRAW UNI/BI: CPT | Performed by: OPHTHALMOLOGY

## 2024-02-12 PROCEDURE — 92015 DETERMINE REFRACTIVE STATE: CPT | Performed by: OPHTHALMOLOGY

## 2024-02-12 PROCEDURE — 92014 COMPRE OPH EXAM EST PT 1/>: CPT | Performed by: OPHTHALMOLOGY

## 2024-02-12 PROCEDURE — 92134 CPTRZ OPH DX IMG PST SGM RTA: CPT | Performed by: OPHTHALMOLOGY

## 2024-02-12 ASSESSMENT — CONFRONTATIONAL VISUAL FIELD TEST (CVF)
OD_FINDINGS: FULL
OS_FINDINGS: FULL

## 2024-02-12 ASSESSMENT — REFRACTION_CURRENTRX
OS_OVR_VA: 20/
OS_SPHERE: -0.25
OD_VPRISM_DIRECTION: BF
OS_AXIS: 017
OD_OVR_VA: 20/
OS_ADD: +2.50
OD_SPHERE: -0.25
OD_CYLINDER: +0.50
OS_VPRISM_DIRECTION: BF
OD_ADD: +2.50
OD_AXIS: 012
OS_CYLINDER: +1.00

## 2024-02-12 ASSESSMENT — REFRACTION_MANIFEST
OS_ADD: +2.50
OD_VA2: 20/25(J1)
OD_VA2: 20/25(J1)
OS_VA2: 20/25(J1)
OD_CYLINDER: SPHERE
OS_SPHERE: PLANO
OS_AXIS: 005
OD_ADD: +2.50
OD_SPHERE: PLANO
OU_VA: 20/25
OD_VA1: 20/25
OS_VA2: 20/25(J1)
OS_CYLINDER: +0.75
OS_VA1: 20/40
OD_ADD: +3.00
OS_CYLINDER: +0.75
OS_SPHERE: PLANO
OU_VA: 20/25
OD_VA1: 20/25
OS_VA1: 20/40
OS_ADD: +3.00
OD_SPHERE: PLANO
OD_CYLINDER: SPH
OS_AXIS: 005

## 2024-02-12 ASSESSMENT — LID POSITION - DERMATOCHALASIS
OS_DERMATOCHALASIS: LUL 2+
OD_DERMATOCHALASIS: RUL 2+

## 2024-02-12 ASSESSMENT — REFRACTION_AUTOREFRACTION
OS_SPHERE: -0.25
OD_CYLINDER: +0.25
OS_AXIS: 001
OD_AXIS: 007
OS_CYLINDER: +1.25
OD_SPHERE: -0.25

## 2024-02-12 ASSESSMENT — SPHEQUIV_DERIVED
OD_SPHEQUIV: -0.125
OS_SPHEQUIV: 0.375

## 2024-05-23 ENCOUNTER — NON-APPOINTMENT (OUTPATIENT)
Age: 79
End: 2024-05-23

## 2024-05-23 ENCOUNTER — APPOINTMENT (OUTPATIENT)
Dept: OPHTHALMOLOGY | Facility: CLINIC | Age: 79
End: 2024-05-23
Payer: MEDICARE

## 2024-05-23 PROCEDURE — 92134 CPTRZ OPH DX IMG PST SGM RTA: CPT

## 2024-05-23 PROCEDURE — 92004 COMPRE OPH EXAM NEW PT 1/>: CPT

## 2024-06-10 ENCOUNTER — APPOINTMENT (OUTPATIENT)
Dept: OPHTHALMOLOGY | Facility: CLINIC | Age: 79
End: 2024-06-10
Payer: MEDICARE

## 2024-06-10 ENCOUNTER — NON-APPOINTMENT (OUTPATIENT)
Age: 79
End: 2024-06-10

## 2024-06-10 PROCEDURE — 99213 OFFICE O/P EST LOW 20 MIN: CPT

## 2024-06-10 PROCEDURE — 92136 OPHTHALMIC BIOMETRY: CPT

## 2024-06-13 ENCOUNTER — TRANSCRIPTION ENCOUNTER (OUTPATIENT)
Age: 79
End: 2024-06-13

## 2024-06-14 ENCOUNTER — OUTPATIENT (OUTPATIENT)
Dept: OUTPATIENT SERVICES | Facility: HOSPITAL | Age: 79
LOS: 1 days | End: 2024-06-14
Payer: MEDICARE

## 2024-06-14 ENCOUNTER — TRANSCRIPTION ENCOUNTER (OUTPATIENT)
Age: 79
End: 2024-06-14

## 2024-06-14 VITALS
RESPIRATION RATE: 22 BRPM | SYSTOLIC BLOOD PRESSURE: 134 MMHG | DIASTOLIC BLOOD PRESSURE: 79 MMHG | HEART RATE: 78 BPM | OXYGEN SATURATION: 95 % | WEIGHT: 228.84 LBS | HEIGHT: 70 IN | TEMPERATURE: 98 F

## 2024-06-14 VITALS
DIASTOLIC BLOOD PRESSURE: 72 MMHG | OXYGEN SATURATION: 97 % | SYSTOLIC BLOOD PRESSURE: 131 MMHG | RESPIRATION RATE: 20 BRPM | HEART RATE: 80 BPM

## 2024-06-14 DIAGNOSIS — Z98.890 OTHER SPECIFIED POSTPROCEDURAL STATES: Chronic | ICD-10-CM

## 2024-06-14 DIAGNOSIS — Z98.49 CATARACT EXTRACTION STATUS, UNSPECIFIED EYE: Chronic | ICD-10-CM

## 2024-06-14 DIAGNOSIS — H33.022 RETINAL DETACHMENT WITH MULTIPLE BREAKS, LEFT EYE: ICD-10-CM

## 2024-06-14 LAB
ACETONE SERPL-MCNC: NEGATIVE — SIGNIFICANT CHANGE UP
ALBUMIN SERPL ELPH-MCNC: 4.1 G/DL — SIGNIFICANT CHANGE UP (ref 3.3–5)
ALP SERPL-CCNC: 75 U/L — SIGNIFICANT CHANGE UP (ref 30–120)
ALT FLD-CCNC: 20 U/L — SIGNIFICANT CHANGE UP (ref 10–60)
ANION GAP SERPL CALC-SCNC: 12 MMOL/L — SIGNIFICANT CHANGE UP (ref 5–17)
AST SERPL-CCNC: 20 U/L — SIGNIFICANT CHANGE UP (ref 10–40)
BASE EXCESS BLDV CALC-SCNC: -5 MMOL/L — LOW (ref -2–3)
BILIRUB SERPL-MCNC: 0.6 MG/DL — SIGNIFICANT CHANGE UP (ref 0.2–1.2)
BUN SERPL-MCNC: 19 MG/DL — SIGNIFICANT CHANGE UP (ref 7–23)
CALCIUM SERPL-MCNC: 9.2 MG/DL — SIGNIFICANT CHANGE UP (ref 8.4–10.5)
CHLORIDE SERPL-SCNC: 107 MMOL/L — SIGNIFICANT CHANGE UP (ref 96–108)
CO2 SERPL-SCNC: 20 MMOL/L — LOW (ref 22–31)
CREAT SERPL-MCNC: 1.12 MG/DL — SIGNIFICANT CHANGE UP (ref 0.5–1.3)
EGFR: 67 ML/MIN/1.73M2 — SIGNIFICANT CHANGE UP
GLUCOSE BLDC GLUCOMTR-MCNC: 115 MG/DL — HIGH (ref 70–99)
GLUCOSE SERPL-MCNC: 122 MG/DL — HIGH (ref 70–99)
HCO3 BLDV-SCNC: 20 MMOL/L — LOW (ref 22–29)
HOROWITZ INDEX BLDV+IHG-RTO: 21 — SIGNIFICANT CHANGE UP
PCO2 BLDV: 30 MMHG — LOW (ref 42–55)
PH BLDV: 7.42 — SIGNIFICANT CHANGE UP (ref 7.32–7.43)
PO2 BLDV: 98 MMHG — HIGH (ref 25–45)
POTASSIUM SERPL-MCNC: 4.4 MMOL/L — SIGNIFICANT CHANGE UP (ref 3.5–5.3)
POTASSIUM SERPL-SCNC: 4.4 MMOL/L — SIGNIFICANT CHANGE UP (ref 3.5–5.3)
PROT SERPL-MCNC: 7.7 G/DL — SIGNIFICANT CHANGE UP (ref 6–8.3)
SAO2 % BLDV: 98.7 % — HIGH (ref 67–88)
SODIUM SERPL-SCNC: 139 MMOL/L — SIGNIFICANT CHANGE UP (ref 135–145)

## 2024-06-14 PROCEDURE — 82009 KETONE BODYS QUAL: CPT

## 2024-06-14 PROCEDURE — 82962 GLUCOSE BLOOD TEST: CPT

## 2024-06-14 PROCEDURE — 67113 REPAIR RETINAL DETACH CPLX: CPT | Mod: AS,LT

## 2024-06-14 PROCEDURE — 67113 REPAIR RETINAL DETACH CPLX: CPT | Mod: LT

## 2024-06-14 PROCEDURE — 80053 COMPREHEN METABOLIC PANEL: CPT

## 2024-06-14 PROCEDURE — C1889: CPT

## 2024-06-14 PROCEDURE — 82803 BLOOD GASES ANY COMBINATION: CPT

## 2024-06-14 PROCEDURE — 36415 COLL VENOUS BLD VENIPUNCTURE: CPT

## 2024-06-14 RX ORDER — ALBUTEROL 90 UG/1
2 AEROSOL, METERED ORAL
Refills: 0 | DISCHARGE

## 2024-06-14 RX ORDER — DAPAGLIFLOZIN 10 MG/1
2 TABLET, FILM COATED ORAL
Refills: 0 | DISCHARGE

## 2024-06-14 RX ORDER — METFORMIN HYDROCHLORIDE 850 MG/1
1 TABLET ORAL
Refills: 0 | DISCHARGE

## 2024-06-14 RX ORDER — ASPIRIN/CALCIUM CARB/MAGNESIUM 324 MG
81 TABLET ORAL
Refills: 0 | DISCHARGE

## 2024-06-14 RX ORDER — SEMAGLUTIDE 0.68 MG/ML
0 INJECTION, SOLUTION SUBCUTANEOUS
Refills: 0 | DISCHARGE

## 2024-06-14 RX ORDER — DOXAZOSIN MESYLATE 4 MG
1 TABLET ORAL
Refills: 0 | DISCHARGE

## 2024-06-14 RX ORDER — INSULIN GLARGINE 100 [IU]/ML
0 INJECTION, SOLUTION SUBCUTANEOUS
Refills: 0 | DISCHARGE

## 2024-06-14 RX ORDER — LISINOPRIL 2.5 MG/1
1 TABLET ORAL
Refills: 0 | DISCHARGE

## 2024-06-14 RX ORDER — FLUTICASONE FUROATE, UMECLIDINIUM BROMIDE AND VILANTEROL TRIFENATATE 200; 62.5; 25 UG/1; UG/1; UG/1
1 POWDER RESPIRATORY (INHALATION)
Refills: 0 | DISCHARGE

## 2024-06-14 RX ORDER — ALBUTEROL 90 UG/1
3 AEROSOL, METERED ORAL
Refills: 0 | DISCHARGE

## 2024-06-14 RX ORDER — ATORVASTATIN CALCIUM 80 MG/1
1 TABLET, FILM COATED ORAL
Refills: 0 | DISCHARGE

## 2024-06-14 RX ORDER — OMEPRAZOLE 10 MG/1
1 CAPSULE, DELAYED RELEASE ORAL
Refills: 0 | DISCHARGE

## 2024-06-14 NOTE — ASU DISCHARGE PLAN (ADULT/PEDIATRIC) - MODE OF TRANSPORTATION
Eugenio Agee MD (PGY3): Patient reassessed, initially came with a HA 9/10, now no HA. Feels better overall after meds. Will give neuro follow-up. strict return precautions discussed. Wheelchair/Stroller

## 2024-06-14 NOTE — ASU DISCHARGE PLAN (ADULT/PEDIATRIC) - PROVIDER TOKENS
FREE:[LAST:[Shell],FIRST:[Manuel],PHONE:[(   )    -],FAX:[(   )    -],ADDRESS:[[address] [place] [address]],SCHEDULEDAPPT:[06/15/2024],SCHEDULEDAPPTTIME:[11:15 AM]]

## 2024-06-14 NOTE — ASU DISCHARGE PLAN (ADULT/PEDIATRIC) - ASU DC SPECIAL INSTRUCTIONSFT
face down as much as possible, may sleep on your right side with ear on the pillow. restart aspirin tomorrow

## 2024-06-14 NOTE — ASU PATIENT PROFILE, ADULT - PATIENT REPRESENTATIVE NAME
Kaden Rossi (Nephfrancine)/Neelima [facility]

## 2024-06-14 NOTE — ASU PATIENT PROFILE, ADULT - NSICDXPASTMEDICALHX_GEN_ALL_CORE_FT
PAST MEDICAL HISTORY:  Arthritis     Chronic kidney disease, unspecified CKD stage     H/O emphysema     Hypertension     Prostate cancer     Type 2 diabetes mellitus      PAST MEDICAL HISTORY:  Arthritis     Chronic kidney disease, unspecified CKD stage     COPD (chronic obstructive pulmonary disease)     Hypertension     Prostate cancer     Type 2 diabetes mellitus

## 2024-06-14 NOTE — ASU PATIENT PROFILE, ADULT - NSICDXPASTSURGICALHX_GEN_ALL_CORE_FT
PAST SURGICAL HISTORY:  H/O bone graft left tibia/knee    History of surgery on arm right    History of surgical removal of lesion left eyelid x3    S/P cataract surgery     S/P herniorrhaphy      PAST SURGICAL HISTORY:  H/O bone graft left tibia/knee    History of surgical removal of lesion left eyelid x3    S/P cataract surgery right eye    S/P herniorrhaphy      PAST SURGICAL HISTORY:  H/O bone graft left tibia/knee    H/O hernia repair left groin    History of surgical removal of lesion left eyelid x3    S/P cataract surgery right eye

## 2024-06-14 NOTE — ASU DISCHARGE PLAN (ADULT/PEDIATRIC) - CARE PROVIDER_API CALL
Manuel Goff  [address] [place], [place] [id number]  Phone: (   )    -  Fax: (   )    -  Scheduled Appointment: 06/15/2024 11:15 AM

## 2024-06-24 ENCOUNTER — APPOINTMENT (OUTPATIENT)
Dept: OPHTHALMOLOGY | Facility: HOSPITAL | Age: 79
End: 2024-06-24

## 2024-06-25 ENCOUNTER — APPOINTMENT (OUTPATIENT)
Dept: OPHTHALMOLOGY | Facility: CLINIC | Age: 79
End: 2024-06-25

## 2024-07-08 ENCOUNTER — APPOINTMENT (OUTPATIENT)
Dept: OPHTHALMOLOGY | Facility: CLINIC | Age: 79
End: 2024-07-08

## 2024-07-25 ENCOUNTER — APPOINTMENT (OUTPATIENT)
Dept: OPHTHALMOLOGY | Facility: CLINIC | Age: 79
End: 2024-07-25

## 2024-10-10 ENCOUNTER — APPOINTMENT (OUTPATIENT)
Dept: OPHTHALMOLOGY | Facility: CLINIC | Age: 79
End: 2024-10-10

## 2024-10-10 ENCOUNTER — NON-APPOINTMENT (OUTPATIENT)
Age: 79
End: 2024-10-10

## 2024-10-10 PROCEDURE — 99213 OFFICE O/P EST LOW 20 MIN: CPT

## 2024-10-28 ENCOUNTER — NON-APPOINTMENT (OUTPATIENT)
Age: 79
End: 2024-10-28

## 2024-10-28 ENCOUNTER — APPOINTMENT (OUTPATIENT)
Dept: OPHTHALMOLOGY | Facility: HOSPITAL | Age: 79
End: 2024-10-28
Payer: MEDICARE

## 2024-10-28 PROCEDURE — 66982 XCAPSL CTRC RMVL CPLX WO ECP: CPT | Mod: LT

## 2024-10-29 ENCOUNTER — NON-APPOINTMENT (OUTPATIENT)
Age: 79
End: 2024-10-29

## 2024-10-29 ENCOUNTER — APPOINTMENT (OUTPATIENT)
Dept: OPHTHALMOLOGY | Facility: CLINIC | Age: 79
End: 2024-10-29

## 2024-10-29 PROCEDURE — 99024 POSTOP FOLLOW-UP VISIT: CPT

## 2024-11-14 ENCOUNTER — NON-APPOINTMENT (OUTPATIENT)
Age: 79
End: 2024-11-14

## 2024-11-14 ENCOUNTER — APPOINTMENT (OUTPATIENT)
Dept: OPHTHALMOLOGY | Facility: CLINIC | Age: 79
End: 2024-11-14
Payer: MEDICARE

## 2024-11-14 PROCEDURE — 99024 POSTOP FOLLOW-UP VISIT: CPT

## 2024-11-25 ENCOUNTER — APPOINTMENT (OUTPATIENT)
Dept: OPHTHALMOLOGY | Facility: CLINIC | Age: 79
End: 2024-11-25
Payer: MEDICARE

## 2024-11-25 ENCOUNTER — NON-APPOINTMENT (OUTPATIENT)
Age: 79
End: 2024-11-25

## 2024-11-25 PROCEDURE — 99024 POSTOP FOLLOW-UP VISIT: CPT

## 2025-02-05 ENCOUNTER — OFFICE (OUTPATIENT)
Dept: URBAN - METROPOLITAN AREA CLINIC 97 | Facility: CLINIC | Age: 80
Setting detail: OPHTHALMOLOGY
End: 2025-02-05
Payer: MEDICARE

## 2025-02-05 DIAGNOSIS — H02.831: ICD-10-CM

## 2025-02-05 DIAGNOSIS — E11.9: ICD-10-CM

## 2025-02-05 DIAGNOSIS — H35.363: ICD-10-CM

## 2025-02-05 DIAGNOSIS — H35.3112: ICD-10-CM

## 2025-02-05 DIAGNOSIS — H35.3221: ICD-10-CM

## 2025-02-05 DIAGNOSIS — H35.032: ICD-10-CM

## 2025-02-05 DIAGNOSIS — H31.002: ICD-10-CM

## 2025-02-05 DIAGNOSIS — H43.812: ICD-10-CM

## 2025-02-05 DIAGNOSIS — H31.013: ICD-10-CM

## 2025-02-05 DIAGNOSIS — Z96.1: ICD-10-CM

## 2025-02-05 DIAGNOSIS — H02.834: ICD-10-CM

## 2025-02-05 PROCEDURE — 92134 CPTRZ OPH DX IMG PST SGM RTA: CPT | Performed by: OPHTHALMOLOGY

## 2025-02-05 PROCEDURE — 92014 COMPRE OPH EXAM EST PT 1/>: CPT | Performed by: OPHTHALMOLOGY

## 2025-02-05 ASSESSMENT — REFRACTION_MANIFEST
OD_VA1: 20/25+
OD_CYLINDER: SPH
OS_VA1: 20/50-
OS_VA2: 20/25(J1)
OS_SPHERE: -1.25
OD_VA2: 20/25(J1)
OS_ADD: +3.00
OS_CYLINDER: SPH
OD_VA1: 20/25
OS_SPHERE: PLANO
OS_VA1: 20/40
OU_VA: 20/25
OD_ADD: +2.75
OD_CYLINDER: SPH
OS_VA2: 20/25(J1)
OS_AXIS: 005
OU_VA: 20/25-3
OS_ADD: +2.75
OD_VA2: 20/25(J1)
OS_CYLINDER: +0.75
OD_SPHERE: PLANO
OD_ADD: +3.00
OD_SPHERE: PLANO

## 2025-02-05 ASSESSMENT — REFRACTION_CURRENTRX
OS_ADD: +2.50
OD_AXIS: 012
OD_VPRISM_DIRECTION: BF
OD_ADD: +2.50
OD_SPHERE: -0.25
OD_OVR_VA: 20/
OS_OVR_VA: 20/
OS_CYLINDER: +1.00
OD_CYLINDER: +0.50
OS_VPRISM_DIRECTION: BF
OS_AXIS: 017
OS_SPHERE: -0.25

## 2025-02-05 ASSESSMENT — REFRACTION_AUTOREFRACTION
OD_AXIS: 046
OS_SPHERE: -1.25
OS_CYLINDER: +0.25
OD_SPHERE: -0.25
OD_CYLINDER: +0.50
OS_AXIS: 073

## 2025-02-05 ASSESSMENT — KERATOMETRY
OS_K2POWER_DIOPTERS: 44.75
METHOD_AUTO_MANUAL: AUTO
OD_K2POWER_DIOPTERS: 44.50
OD_K1POWER_DIOPTERS: 44.00
OS_AXISANGLE_DEGREES: 076
OS_K1POWER_DIOPTERS: 43.50
OD_AXISANGLE_DEGREES: 080

## 2025-02-05 ASSESSMENT — LID POSITION - DERMATOCHALASIS
OS_DERMATOCHALASIS: LUL 2+
OD_DERMATOCHALASIS: RUL 2+

## 2025-02-05 ASSESSMENT — VISUAL ACUITY
OD_BCVA: 20/80-
OS_BCVA: 20/25-

## 2025-02-05 ASSESSMENT — CONFRONTATIONAL VISUAL FIELD TEST (CVF)
OS_FINDINGS: FULL
OD_FINDINGS: FULL

## 2025-04-10 ENCOUNTER — APPOINTMENT (OUTPATIENT)
Dept: OPHTHALMOLOGY | Facility: CLINIC | Age: 80
End: 2025-04-10